# Patient Record
Sex: MALE | URBAN - METROPOLITAN AREA
[De-identification: names, ages, dates, MRNs, and addresses within clinical notes are randomized per-mention and may not be internally consistent; named-entity substitution may affect disease eponyms.]

---

## 2021-11-05 ENCOUNTER — OFFICE VISIT (OUTPATIENT)
Dept: URBAN - METROPOLITAN AREA CLINIC 5 | Facility: CLINIC | Age: 41
End: 2021-11-05
Payer: MEDICARE

## 2021-11-05 DIAGNOSIS — H53.023 REFRACTIVE AMBLYOPIA, BILATERAL: Primary | ICD-10-CM

## 2021-11-05 DIAGNOSIS — H52.223 REGULAR ASTIGMATISM, BILATERAL: ICD-10-CM

## 2021-11-05 PROCEDURE — 92004 COMPRE OPH EXAM NEW PT 1/>: CPT | Performed by: OPTOMETRIST

## 2021-11-05 ASSESSMENT — VISUAL ACUITY
OS: 20/40
OD: 20/40

## 2021-11-05 ASSESSMENT — INTRAOCULAR PRESSURE
OD: 18
OS: 15

## 2021-11-05 NOTE — IMPRESSION/PLAN
Impression: Refractive amblyopia, bilateral: H53.023. Plan: Refractive amblyopia, hx of strabismus surgery with Dr. Bertha Mijares - The pathophysiology of amblyopia was explained. Stressed the importance of full-time spectacle wear.   Recommend use of polycarbonate lenses for extra ocular protection, and discussed the importance of ocular protection during any potentially dangerous activity

## 2021-11-05 NOTE — IMPRESSION/PLAN
Impression: Regular astigmatism, bilateral: H52.223. Plan: Blurry vision / refractive error: Prescription given for glasses. Dilated exam performed.

## 2022-11-08 ENCOUNTER — Encounter (OUTPATIENT)
Dept: URBAN - METROPOLITAN AREA CLINIC 5 | Facility: CLINIC | Age: 42
End: 2022-11-08